# Patient Record
(demographics unavailable — no encounter records)

---

## 2025-03-20 NOTE — PAST MEDICAL HISTORY
[At Term] : at term [Normal Vaginal Route] : by normal vaginal route [None] : there were no delivery complications [Speech Delay w/ Normal Development] : patient has speech delay with normal development [Speech Therapy] : speech therapy [de-identified] : at [place] [FreeTextEntry1] : 8 lbs

## 2025-03-20 NOTE — ASSESSMENT
[FreeTextEntry1] : Ecsobar is a 13y 1m male with a BMI in the obese range likely secondary to excess caloric intake and lack of physical activity. He has acanthosis nigricans as a sign of insulin resistance. His stretched penile length is normal, however he has a prominent suprapubic fat pad which makes the appearance of his penis look smaller. His SPL is not consistent with micropenis. His hemoglobin A1c is in the normal range, however his fasting glucose is slightly elevated.   Plan - Obtain fasting blood work and parents to contact me 1-2 weeks later to discuss results.  - We discussed that his penis is of normal size, however is hidden by the prominent fat pad.  - I have recommended significant lifestyle changes.  - Decrease caloric intake.  - Decrease carbohydrates in the meal plan.  - Eliminate sugar-containing beverages in the meal plan.  - Increase physical activity.  - Referred to nutrition.  - Referred to [facility].  - Follow up in 6 months.   Bhargavi Wilder MD Pediatric Endocrinology [address], [address] [place] [address] [phone]

## 2025-03-20 NOTE — REVIEW OF SYSTEMS
[Nl] : Neurological [NI] : Endocrine [Feels Overweight] : feels overweight [Pubertal Concerns] : pubertal concerns

## 2025-03-20 NOTE — HISTORY OF PRESENT ILLNESS
[FreeTextEntry2] : Escobar is a 13y 1m male here for evaluation of evaluation of micropenis.   Mother reports that Escobar has recently had episodes of nocturnal enuresis for which he saw a urologist in [facility] who noted a smaller penile size in Escobar and recommended evaluation by endocrinology. Mother reports that urology deemed the nocturnal enuresis to be secondary to Escobar's small penis size. Mother reports that Escobar eats a meal plan heavy in carbohydrates and that he drinks juice and soda. Escobar is not physically active. Mother reports noticing darkening of the skin around Escobar's neck within the last year. She denies polydipsia and polyuria in Escobar.

## 2025-03-20 NOTE — CONSULT LETTER
[Dear  ___] : Dear  [unfilled], [Consult Letter:] : I had the pleasure of evaluating your patient, [unfilled]. [Please see my note below.] : Please see my note below. [Consult Closing:] : Thank you very much for allowing me to participate in the care of this patient.  If you have any questions, please do not hesitate to contact me. [Sincerely,] : Sincerely, [FreeTextEntry3] : Bhargavi Wilder MD Pediatric Endocrinologist

## 2025-03-20 NOTE — PHYSICAL EXAM
[Well Nourished] : well nourished [Interactive] : interactive [Obese] : obese [Acanthosis Nigricans___] : acanthosis nigricans over [unfilled] [Pale Striae on Flanks] : pale striae on flanks [Normal Appearance] : normal appearance [Well formed] : well formed [Normally Set] : normally set [Normal S1 and S2] : normal S1 and S2 [Clear to Ausculation Bilaterally] : clear to auscultation bilaterally [Abdomen Soft] : soft [Abdomen Tenderness] : non-tender [] : no hepatosplenomegaly [Normal] : normal  [Murmur] : no murmurs